# Patient Record
Sex: MALE | Race: WHITE | NOT HISPANIC OR LATINO | URBAN - METROPOLITAN AREA
[De-identification: names, ages, dates, MRNs, and addresses within clinical notes are randomized per-mention and may not be internally consistent; named-entity substitution may affect disease eponyms.]

---

## 2017-07-04 ENCOUNTER — INPATIENT (INPATIENT)
Facility: HOSPITAL | Age: 32
LOS: 1 days | Discharge: HOME | DRG: 287 | End: 2017-07-06
Attending: STUDENT IN AN ORGANIZED HEALTH CARE EDUCATION/TRAINING PROGRAM | Admitting: STUDENT IN AN ORGANIZED HEALTH CARE EDUCATION/TRAINING PROGRAM
Payer: COMMERCIAL

## 2017-07-04 VITALS
WEIGHT: 230.6 LBS | DIASTOLIC BLOOD PRESSURE: 77 MMHG | RESPIRATION RATE: 10 BRPM | HEART RATE: 68 BPM | SYSTOLIC BLOOD PRESSURE: 129 MMHG | OXYGEN SATURATION: 97 % | TEMPERATURE: 100 F | HEIGHT: 71 IN

## 2017-07-04 DIAGNOSIS — R07.9 CHEST PAIN, UNSPECIFIED: ICD-10-CM

## 2017-07-04 DIAGNOSIS — I40.9 ACUTE MYOCARDITIS, UNSPECIFIED: ICD-10-CM

## 2017-07-04 LAB
ALBUMIN SERPL ELPH-MCNC: 3.5 G/DL — SIGNIFICANT CHANGE UP (ref 3.3–5)
ALP SERPL-CCNC: 66 U/L — SIGNIFICANT CHANGE UP (ref 40–120)
ALT FLD-CCNC: 28 U/L RC — SIGNIFICANT CHANGE UP (ref 10–45)
ANION GAP SERPL CALC-SCNC: 16 MMOL/L — SIGNIFICANT CHANGE UP (ref 5–17)
APTT BLD: 59.8 SEC — HIGH (ref 27.5–37.4)
AST SERPL-CCNC: 53 U/L — HIGH (ref 10–40)
BASOPHILS # BLD AUTO: 0.1 K/UL — SIGNIFICANT CHANGE UP (ref 0–0.2)
BASOPHILS NFR BLD AUTO: 0.6 % — SIGNIFICANT CHANGE UP (ref 0–2)
BILIRUB SERPL-MCNC: 0.3 MG/DL — SIGNIFICANT CHANGE UP (ref 0.2–1.2)
BUN SERPL-MCNC: 9 MG/DL — SIGNIFICANT CHANGE UP (ref 7–23)
CALCIUM SERPL-MCNC: 8.6 MG/DL — SIGNIFICANT CHANGE UP (ref 8.4–10.5)
CHLORIDE SERPL-SCNC: 106 MMOL/L — SIGNIFICANT CHANGE UP (ref 96–108)
CHOLEST SERPL-MCNC: 102 MG/DL — SIGNIFICANT CHANGE UP (ref 10–199)
CK MB BLD-MCNC: 6.4 % — HIGH (ref 0–3.5)
CK MB CFR SERPL CALC: 28.2 NG/ML — HIGH (ref 0–6.7)
CK SERPL-CCNC: 439 U/L — HIGH (ref 30–200)
CO2 SERPL-SCNC: 20 MMOL/L — LOW (ref 22–31)
CREAT SERPL-MCNC: 0.83 MG/DL — SIGNIFICANT CHANGE UP (ref 0.5–1.3)
CRP SERPL-MCNC: 4.2 MG/DL — HIGH (ref 0–0.4)
EOSINOPHIL # BLD AUTO: 0.2 K/UL — SIGNIFICANT CHANGE UP (ref 0–0.5)
EOSINOPHIL NFR BLD AUTO: 1.8 % — SIGNIFICANT CHANGE UP (ref 0–6)
ERYTHROCYTE [SEDIMENTATION RATE] IN BLOOD: 9 MM/HR — SIGNIFICANT CHANGE UP (ref 0–15)
GLUCOSE SERPL-MCNC: 111 MG/DL — HIGH (ref 70–99)
HCT VFR BLD CALC: 41.1 % — SIGNIFICANT CHANGE UP (ref 39–50)
HDLC SERPL-MCNC: 31 MG/DL — LOW (ref 40–125)
HGB BLD-MCNC: 14.2 G/DL — SIGNIFICANT CHANGE UP (ref 13–17)
INR BLD: 1.16 RATIO — SIGNIFICANT CHANGE UP (ref 0.88–1.16)
LIPID PNL WITH DIRECT LDL SERPL: 60 MG/DL — SIGNIFICANT CHANGE UP
LYMPHOCYTES # BLD AUTO: 19.7 % — SIGNIFICANT CHANGE UP (ref 13–44)
LYMPHOCYTES # BLD AUTO: 2.4 K/UL — SIGNIFICANT CHANGE UP (ref 1–3.3)
MAGNESIUM SERPL-MCNC: 1.8 MG/DL — SIGNIFICANT CHANGE UP (ref 1.6–2.6)
MCHC RBC-ENTMCNC: 31.5 PG — SIGNIFICANT CHANGE UP (ref 27–34)
MCHC RBC-ENTMCNC: 34.5 GM/DL — SIGNIFICANT CHANGE UP (ref 32–36)
MCV RBC AUTO: 91.4 FL — SIGNIFICANT CHANGE UP (ref 80–100)
MONOCYTES # BLD AUTO: 1.6 K/UL — HIGH (ref 0–0.9)
MONOCYTES NFR BLD AUTO: 13 % — SIGNIFICANT CHANGE UP (ref 2–14)
NEUTROPHILS # BLD AUTO: 7.9 K/UL — HIGH (ref 1.8–7.4)
NEUTROPHILS NFR BLD AUTO: 64.9 % — SIGNIFICANT CHANGE UP (ref 43–77)
PHOSPHATE SERPL-MCNC: 2.7 MG/DL — SIGNIFICANT CHANGE UP (ref 2.5–4.5)
PLATELET # BLD AUTO: 225 K/UL — SIGNIFICANT CHANGE UP (ref 150–400)
POTASSIUM SERPL-MCNC: 3.5 MMOL/L — SIGNIFICANT CHANGE UP (ref 3.5–5.3)
POTASSIUM SERPL-SCNC: 3.5 MMOL/L — SIGNIFICANT CHANGE UP (ref 3.5–5.3)
PROT SERPL-MCNC: 6.4 G/DL — SIGNIFICANT CHANGE UP (ref 6–8.3)
PROTHROM AB SERPL-ACNC: 12.6 SEC — SIGNIFICANT CHANGE UP (ref 9.8–12.7)
RBC # BLD: 4.49 M/UL — SIGNIFICANT CHANGE UP (ref 4.2–5.8)
RBC # FLD: 12.8 % — SIGNIFICANT CHANGE UP (ref 10.3–14.5)
SODIUM SERPL-SCNC: 142 MMOL/L — SIGNIFICANT CHANGE UP (ref 135–145)
TOTAL CHOLESTEROL/HDL RATIO MEASUREMENT: 3.3 RATIO — LOW (ref 3.4–9.6)
TRIGL SERPL-MCNC: 53 MG/DL — SIGNIFICANT CHANGE UP (ref 10–149)
TROPONIN T SERPL-MCNC: 0.42 NG/ML — HIGH (ref 0–0.06)
TSH SERPL-MCNC: 1.24 UIU/ML — SIGNIFICANT CHANGE UP (ref 0.27–4.2)
WBC # BLD: 12.1 K/UL — HIGH (ref 3.8–10.5)
WBC # FLD AUTO: 12.1 K/UL — HIGH (ref 3.8–10.5)

## 2017-07-04 PROCEDURE — 93458 L HRT ARTERY/VENTRICLE ANGIO: CPT | Mod: 26,GC

## 2017-07-04 PROCEDURE — 93010 ELECTROCARDIOGRAM REPORT: CPT

## 2017-07-04 PROCEDURE — 71010: CPT | Mod: 26

## 2017-07-04 RX ORDER — COLCHICINE 0.6 MG
0.6 TABLET ORAL DAILY
Qty: 0 | Refills: 0 | Status: DISCONTINUED | OUTPATIENT
Start: 2017-07-04 | End: 2017-07-05

## 2017-07-04 RX ORDER — ACETAMINOPHEN 500 MG
650 TABLET ORAL EVERY 6 HOURS
Qty: 0 | Refills: 0 | Status: DISCONTINUED | OUTPATIENT
Start: 2017-07-04 | End: 2017-07-06

## 2017-07-04 RX ORDER — METOPROLOL TARTRATE 50 MG
12.5 TABLET ORAL
Qty: 0 | Refills: 0 | Status: DISCONTINUED | OUTPATIENT
Start: 2017-07-04 | End: 2017-07-06

## 2017-07-04 RX ORDER — POTASSIUM CHLORIDE 20 MEQ
20 PACKET (EA) ORAL
Qty: 0 | Refills: 0 | Status: COMPLETED | OUTPATIENT
Start: 2017-07-04 | End: 2017-07-04

## 2017-07-04 RX ORDER — MAGNESIUM SULFATE 500 MG/ML
1 VIAL (ML) INJECTION ONCE
Qty: 0 | Refills: 0 | Status: COMPLETED | OUTPATIENT
Start: 2017-07-04 | End: 2017-07-04

## 2017-07-04 RX ORDER — IBUPROFEN 200 MG
300 TABLET ORAL ONCE
Qty: 0 | Refills: 0 | Status: COMPLETED | OUTPATIENT
Start: 2017-07-04 | End: 2017-07-04

## 2017-07-04 RX ADMIN — Medication 20 MILLIEQUIVALENT(S): at 21:05

## 2017-07-04 RX ADMIN — Medication 20 MILLIEQUIVALENT(S): at 23:26

## 2017-07-04 RX ADMIN — Medication 20 MILLIEQUIVALENT(S): at 22:07

## 2017-07-04 RX ADMIN — Medication 300 MILLIGRAM(S): at 23:38

## 2017-07-04 RX ADMIN — Medication 0.6 MILLIGRAM(S): at 23:38

## 2017-07-04 RX ADMIN — Medication 100 GRAM(S): at 21:07

## 2017-07-04 NOTE — H&P ADULT - PROBLEM SELECTOR PLAN 1
-Likely viral, currently afebrile.   -EKG with dynamic changes. Cardiac cath negative  -TTE, trend enzymes.   -f/u CRP/ESR. Considering starting NSAID/Colchicine.

## 2017-07-04 NOTE — H&P ADULT - ASSESSMENT
31 M with no PMH who presented with substernal chest pain with dynamic EKG changes with non-significant CAD likely 2/2 to shashank-myocarditis

## 2017-07-04 NOTE — H&P ADULT - NSHPPHYSICALEXAM_GEN_ALL_CORE
GENERAL APPEARANCE: Well developed, well nourished, alert and cooperative. NAD.   HEENT:  clear conjunctiva, EOMI. External auditory canals normal, hearing grossly intact.  NECK: Neck supple, non-tender without lymphadenopathy, masses or thyromegaly.  CARDIAC: Normal S1 and S2. No S3, S4 or murmurs. Rhythm is regular.  LUNGS: Clear to auscultation and percussion without rales, rhonchi, wheezing or diminished breath sounds.  ABDOMEN: Positive bowel sounds. Soft, nondistended, nontender. No guarding or rebound.   MUSKULOSKELETAL: ROM intact spine and extremities. No joint erythema or tenderness.   BACK: normal gait and posture, no spinal deformity, symmetry of spinal muscles, without tenderness, decreased range of motion.  EXTREMITIES: No significant deformity or joint abnormality. No edema. Peripheral pulses intact. No varicosities.  NEUROLOGICAL: CN II-XII intact. Strength and sensation symmetric and intact throughout.   SKIN: Skin normal color, texture and turgor with no lesions or eruptions.  PSYCHIATRIC: AOx3.Normal affect and behavior.

## 2017-07-04 NOTE — H&P ADULT - ATTENDING COMMENTS
Young patient presents with chest pain, elevated cardiac biomarkers, and dynamic EKG changes concerning for ACS, cath showed clean coronaries and reduced EF consistent with acute myocarditis. Given recent gastroenteritis, suspect postviral myocarditis.  Echo confirms reduced LVEF.  Biomarkers trending down. Patient is afebrile.    Symptomatically improved.  Discontinue NSAID.  Uptitrate beta-blocker and ACEi as BP permits.

## 2017-07-04 NOTE — H&P ADULT - NSHPREVIEWOFSYSTEMS_GEN_ALL_CORE
CONSTITUTIONAL:  +fever  HEENT:  Eyes:  No visual loss, blurred vision, double vision or yellow sclerae. Ears, Nose, Throat:  +runny nose  SKIN:  No rash or itching.  CARDIOVASCULAR:  + chest pain, no palpitations  RESPIRATORY:  No shortness of breath, cough or sputum.  GASTROINTESTINAL:  +nausea, vomiting, and heartburn  GENITOURINARY:  Denies hematuria, dysuria.   NEUROLOGICAL:  No headache, dizziness, syncope, paralysis, ataxia, numbness or tingling in the extremities. No change in bowel or bladder control.  MUSCULOSKELETAL:  No muscle, back pain, joint pain or stiffness.  HEMATOLOGIC:  No anemia, bleeding or bruising.  LYMPHATICS:  No enlarged nodes. No history of splenectomy.  PSYCHIATRIC:  No history of depression or anxiety.  ENDOCRINOLOGIC:  No reports of sweating, cold or heat intolerance. No polyuria or polydipsia.  ALLERGIES:  No history of asthma, hives, eczema or rhinitis.

## 2017-07-05 LAB
ALBUMIN SERPL ELPH-MCNC: 3.4 G/DL — SIGNIFICANT CHANGE UP (ref 3.3–5)
ALBUMIN SERPL ELPH-MCNC: 3.5 G/DL — SIGNIFICANT CHANGE UP (ref 3.3–5)
ALP SERPL-CCNC: 64 U/L — SIGNIFICANT CHANGE UP (ref 40–120)
ALP SERPL-CCNC: 66 U/L — SIGNIFICANT CHANGE UP (ref 40–120)
ALT FLD-CCNC: 29 U/L RC — SIGNIFICANT CHANGE UP (ref 10–45)
ALT FLD-CCNC: 30 U/L RC — SIGNIFICANT CHANGE UP (ref 10–45)
ANION GAP SERPL CALC-SCNC: 13 MMOL/L — SIGNIFICANT CHANGE UP (ref 5–17)
ANION GAP SERPL CALC-SCNC: 15 MMOL/L — SIGNIFICANT CHANGE UP (ref 5–17)
APTT BLD: 26.4 SEC — LOW (ref 27.5–37.4)
AST SERPL-CCNC: 46 U/L — HIGH (ref 10–40)
AST SERPL-CCNC: 58 U/L — HIGH (ref 10–40)
BASOPHILS # BLD AUTO: 0.1 K/UL — SIGNIFICANT CHANGE UP (ref 0–0.2)
BASOPHILS # BLD AUTO: 0.1 K/UL — SIGNIFICANT CHANGE UP (ref 0–0.2)
BASOPHILS NFR BLD AUTO: 0.4 % — SIGNIFICANT CHANGE UP (ref 0–2)
BASOPHILS NFR BLD AUTO: 0.5 % — SIGNIFICANT CHANGE UP (ref 0–2)
BILIRUB SERPL-MCNC: 0.2 MG/DL — SIGNIFICANT CHANGE UP (ref 0.2–1.2)
BILIRUB SERPL-MCNC: 0.2 MG/DL — SIGNIFICANT CHANGE UP (ref 0.2–1.2)
BLD GP AB SCN SERPL QL: NEGATIVE — SIGNIFICANT CHANGE UP
BUN SERPL-MCNC: 9 MG/DL — SIGNIFICANT CHANGE UP (ref 7–23)
BUN SERPL-MCNC: 9 MG/DL — SIGNIFICANT CHANGE UP (ref 7–23)
CALCIUM SERPL-MCNC: 8.7 MG/DL — SIGNIFICANT CHANGE UP (ref 8.4–10.5)
CALCIUM SERPL-MCNC: 8.8 MG/DL — SIGNIFICANT CHANGE UP (ref 8.4–10.5)
CHLORIDE SERPL-SCNC: 104 MMOL/L — SIGNIFICANT CHANGE UP (ref 96–108)
CHLORIDE SERPL-SCNC: 105 MMOL/L — SIGNIFICANT CHANGE UP (ref 96–108)
CK MB BLD-MCNC: 7.5 % — HIGH (ref 0–3.5)
CK MB BLD-MCNC: 7.5 % — HIGH (ref 0–3.5)
CK MB CFR SERPL CALC: 28.2 NG/ML — HIGH (ref 0–6.7)
CK MB CFR SERPL CALC: 35.6 NG/ML — HIGH (ref 0–6.7)
CK SERPL-CCNC: 378 U/L — HIGH (ref 30–200)
CK SERPL-CCNC: 474 U/L — HIGH (ref 30–200)
CO2 SERPL-SCNC: 22 MMOL/L — SIGNIFICANT CHANGE UP (ref 22–31)
CO2 SERPL-SCNC: 23 MMOL/L — SIGNIFICANT CHANGE UP (ref 22–31)
CREAT SERPL-MCNC: 0.81 MG/DL — SIGNIFICANT CHANGE UP (ref 0.5–1.3)
CREAT SERPL-MCNC: 0.91 MG/DL — SIGNIFICANT CHANGE UP (ref 0.5–1.3)
EOSINOPHIL # BLD AUTO: 0.2 K/UL — SIGNIFICANT CHANGE UP (ref 0–0.5)
EOSINOPHIL # BLD AUTO: 0.2 K/UL — SIGNIFICANT CHANGE UP (ref 0–0.5)
EOSINOPHIL NFR BLD AUTO: 1.4 % — SIGNIFICANT CHANGE UP (ref 0–6)
EOSINOPHIL NFR BLD AUTO: 1.6 % — SIGNIFICANT CHANGE UP (ref 0–6)
GLUCOSE SERPL-MCNC: 109 MG/DL — HIGH (ref 70–99)
GLUCOSE SERPL-MCNC: 111 MG/DL — HIGH (ref 70–99)
HBA1C BLD-MCNC: 5.8 % — HIGH (ref 4–5.6)
HCT VFR BLD CALC: 39.8 % — SIGNIFICANT CHANGE UP (ref 39–50)
HCT VFR BLD CALC: 42.9 % — SIGNIFICANT CHANGE UP (ref 39–50)
HGB BLD-MCNC: 13.8 G/DL — SIGNIFICANT CHANGE UP (ref 13–17)
HGB BLD-MCNC: 14.3 G/DL — SIGNIFICANT CHANGE UP (ref 13–17)
INR BLD: 1.05 RATIO — SIGNIFICANT CHANGE UP (ref 0.88–1.16)
LYMPHOCYTES # BLD AUTO: 1.9 K/UL — SIGNIFICANT CHANGE UP (ref 1–3.3)
LYMPHOCYTES # BLD AUTO: 13.7 % — SIGNIFICANT CHANGE UP (ref 13–44)
LYMPHOCYTES # BLD AUTO: 14.6 % — SIGNIFICANT CHANGE UP (ref 13–44)
LYMPHOCYTES # BLD AUTO: 2.1 K/UL — SIGNIFICANT CHANGE UP (ref 1–3.3)
MAGNESIUM SERPL-MCNC: 2 MG/DL — SIGNIFICANT CHANGE UP (ref 1.6–2.6)
MAGNESIUM SERPL-MCNC: 2.1 MG/DL — SIGNIFICANT CHANGE UP (ref 1.6–2.6)
MCHC RBC-ENTMCNC: 30.6 PG — SIGNIFICANT CHANGE UP (ref 27–34)
MCHC RBC-ENTMCNC: 32.2 PG — SIGNIFICANT CHANGE UP (ref 27–34)
MCHC RBC-ENTMCNC: 33.2 GM/DL — SIGNIFICANT CHANGE UP (ref 32–36)
MCHC RBC-ENTMCNC: 34.8 GM/DL — SIGNIFICANT CHANGE UP (ref 32–36)
MCV RBC AUTO: 92.1 FL — SIGNIFICANT CHANGE UP (ref 80–100)
MCV RBC AUTO: 92.5 FL — SIGNIFICANT CHANGE UP (ref 80–100)
MONOCYTES # BLD AUTO: 1.4 K/UL — HIGH (ref 0–0.9)
MONOCYTES # BLD AUTO: 1.6 K/UL — HIGH (ref 0–0.9)
MONOCYTES NFR BLD AUTO: 10.4 % — SIGNIFICANT CHANGE UP (ref 2–14)
MONOCYTES NFR BLD AUTO: 11.4 % — SIGNIFICANT CHANGE UP (ref 2–14)
NEUTROPHILS # BLD AUTO: 10.1 K/UL — HIGH (ref 1.8–7.4)
NEUTROPHILS # BLD AUTO: 10.2 K/UL — HIGH (ref 1.8–7.4)
NEUTROPHILS NFR BLD AUTO: 72 % — SIGNIFICANT CHANGE UP (ref 43–77)
NEUTROPHILS NFR BLD AUTO: 74 % — SIGNIFICANT CHANGE UP (ref 43–77)
PHOSPHATE SERPL-MCNC: 2.5 MG/DL — SIGNIFICANT CHANGE UP (ref 2.5–4.5)
PHOSPHATE SERPL-MCNC: 3.2 MG/DL — SIGNIFICANT CHANGE UP (ref 2.5–4.5)
PLATELET # BLD AUTO: 210 K/UL — SIGNIFICANT CHANGE UP (ref 150–400)
PLATELET # BLD AUTO: 219 K/UL — SIGNIFICANT CHANGE UP (ref 150–400)
POTASSIUM SERPL-MCNC: 3.9 MMOL/L — SIGNIFICANT CHANGE UP (ref 3.5–5.3)
POTASSIUM SERPL-MCNC: 4 MMOL/L — SIGNIFICANT CHANGE UP (ref 3.5–5.3)
POTASSIUM SERPL-SCNC: 3.9 MMOL/L — SIGNIFICANT CHANGE UP (ref 3.5–5.3)
POTASSIUM SERPL-SCNC: 4 MMOL/L — SIGNIFICANT CHANGE UP (ref 3.5–5.3)
PROT SERPL-MCNC: 6.4 G/DL — SIGNIFICANT CHANGE UP (ref 6–8.3)
PROT SERPL-MCNC: 6.5 G/DL — SIGNIFICANT CHANGE UP (ref 6–8.3)
PROTHROM AB SERPL-ACNC: 11.4 SEC — SIGNIFICANT CHANGE UP (ref 9.8–12.7)
RBC # BLD: 4.3 M/UL — SIGNIFICANT CHANGE UP (ref 4.2–5.8)
RBC # BLD: 4.66 M/UL — SIGNIFICANT CHANGE UP (ref 4.2–5.8)
RBC # FLD: 12.8 % — SIGNIFICANT CHANGE UP (ref 10.3–14.5)
RBC # FLD: 12.9 % — SIGNIFICANT CHANGE UP (ref 10.3–14.5)
RH IG SCN BLD-IMP: POSITIVE — SIGNIFICANT CHANGE UP
SODIUM SERPL-SCNC: 141 MMOL/L — SIGNIFICANT CHANGE UP (ref 135–145)
SODIUM SERPL-SCNC: 141 MMOL/L — SIGNIFICANT CHANGE UP (ref 135–145)
TROPONIN T SERPL-MCNC: 0.49 NG/ML — HIGH (ref 0–0.06)
TROPONIN T SERPL-MCNC: 0.55 NG/ML — HIGH (ref 0–0.06)
WBC # BLD: 13.7 K/UL — HIGH (ref 3.8–10.5)
WBC # BLD: 14.2 K/UL — HIGH (ref 3.8–10.5)
WBC # FLD AUTO: 13.7 K/UL — HIGH (ref 3.8–10.5)
WBC # FLD AUTO: 14.2 K/UL — HIGH (ref 3.8–10.5)

## 2017-07-05 PROCEDURE — 99291 CRITICAL CARE FIRST HOUR: CPT

## 2017-07-05 PROCEDURE — 93306 TTE W/DOPPLER COMPLETE: CPT | Mod: 26,77

## 2017-07-05 RX ORDER — POTASSIUM CHLORIDE 20 MEQ
20 PACKET (EA) ORAL ONCE
Qty: 0 | Refills: 0 | Status: DISCONTINUED | OUTPATIENT
Start: 2017-07-05 | End: 2017-07-05

## 2017-07-05 RX ORDER — LISINOPRIL 2.5 MG/1
2.5 TABLET ORAL DAILY
Qty: 0 | Refills: 0 | Status: DISCONTINUED | OUTPATIENT
Start: 2017-07-05 | End: 2017-07-06

## 2017-07-05 RX ORDER — HEPARIN SODIUM 5000 [USP'U]/ML
5000 INJECTION INTRAVENOUS; SUBCUTANEOUS EVERY 12 HOURS
Qty: 0 | Refills: 0 | Status: DISCONTINUED | OUTPATIENT
Start: 2017-07-05 | End: 2017-07-06

## 2017-07-05 RX ADMIN — Medication 12.5 MILLIGRAM(S): at 18:19

## 2017-07-05 RX ADMIN — Medication 650 MILLIGRAM(S): at 18:20

## 2017-07-05 RX ADMIN — Medication 650 MILLIGRAM(S): at 19:00

## 2017-07-05 RX ADMIN — Medication 12.5 MILLIGRAM(S): at 06:02

## 2017-07-05 RX ADMIN — LISINOPRIL 2.5 MILLIGRAM(S): 2.5 TABLET ORAL at 11:37

## 2017-07-05 RX ADMIN — HEPARIN SODIUM 5000 UNIT(S): 5000 INJECTION INTRAVENOUS; SUBCUTANEOUS at 22:06

## 2017-07-05 RX ADMIN — HEPARIN SODIUM 5000 UNIT(S): 5000 INJECTION INTRAVENOUS; SUBCUTANEOUS at 11:37

## 2017-07-05 RX ADMIN — Medication 300 MILLIGRAM(S): at 00:40

## 2017-07-05 NOTE — CHART NOTE - NSCHARTNOTEFT_GEN_A_CORE
====================  NEW EVENTS:  ====================  Continues to have chest discomfort. Started on colchicine. Was given 300 mg ibuprofen for muscle aches. No occlusive coronary disease on cath. EF estimated was 35 %.    ====================  SUMMARY:  ====================  31 M with no PMH who presented with substernal chest pain with dynamic EKG changes with non-significant CAD likely 2/2 to shashank-myocarditis    ====================  VITALS:  ====================    ICU Vital Signs Last 24 Hrs  T(C): 37.2 (04 Jul 2017 23:00), Max: 37.5 (04 Jul 2017 19:09)  T(F): 98.9 (04 Jul 2017 23:00), Max: 99.5 (04 Jul 2017 19:09)  HR: 62 (04 Jul 2017 23:00) (56 - 68)  BP: 121/66 (04 Jul 2017 23:00) (119/66 - 131/76)  BP(mean): 84 (04 Jul 2017 23:00) (84 - 95)  ABP: --  ABP(mean): --  RR: 22 (04 Jul 2017 23:00) (10 - 22)  SpO2: 99% (04 Jul 2017 23:00) (97% - 100%)      I&O's Summary    04 Jul 2017 07:01  -  05 Jul 2017 00:32  --------------------------------------------------------  IN: 240 mL / OUT: 0 mL / NET: 240 mL        Adult Advanced Hemodynamics Last 24 Hrs  CVP(mm Hg): --  CVP(cm H2O): --  CO: --  CI: --  PA: --  PA(mean): --  PCWP: --  SVR: --  SVRI: --  PVR: --  PVRI: --        ====================  LABS:  ====================                          14.2   12.1  )-----------( 225      ( 04 Jul 2017 20:05 )             41.1     07-04    142  |  106  |  9   ----------------------------<  111<H>  3.5   |  20<L>  |  0.83    Ca    8.6      04 Jul 2017 20:05  Phos  2.7     07-04  Mg     1.8     07-04    TPro  6.4  /  Alb  3.5  /  TBili  0.3  /  DBili  x   /  AST  53<H>  /  ALT  28  /  AlkPhos  66  07-04    PT/INR - ( 04 Jul 2017 20:05 )   PT: 12.6 sec;   INR: 1.16 ratio         PTT - ( 04 Jul 2017 20:05 )  PTT:59.8 sec  Troponin T, Serum: 0.42 ng/mL <H> (07-04-17 @ 20:05)  Creatine Kinase, Serum: 439 U/L <H> (07-04-17 @ 20:05)        ====================  PLAN:  ====================  - Pending repeat TTE  - Continue colchicine

## 2017-07-05 NOTE — PROGRESS NOTE ADULT - PROBLEM SELECTOR PLAN 1
- cath on 7/4 normal vessels, EF 35%  - CE trending down  - given 300 ibuprofen x1  - started on colchicine .6mg daily

## 2017-07-05 NOTE — CHART NOTE - NSCHARTNOTEFT_GEN_A_CORE
====================  CCU MIDNIGHT ROUNDS  ====================    ELANA MENDOZA  [id number]    ====================  SUMMARY:  ====================    31 M with no PMH who presented with substernal chest pain with dynamic EKG changes with non-significant CAD likely 2/2 to leandro-pericarditis    ====================  NEW EVENTS:  ====================    no CP/palpitations/SOB. No fevers/chills     ====================  VITALS (Last 12 hrs):  ====================    T(C): 37.2 (07-05-17 @ 19:00), Max: 37.2 (07-05-17 @ 19:00)  HR: 56 (07-05-17 @ 22:00) (52 - 76)  BP: 127/66 (07-05-17 @ 22:00) (119/77 - 148/79)  BP(mean): 86 (07-05-17 @ 22:00) (86 - 125)  RR: 15 (07-05-17 @ 22:00) (10 - 32)  SpO2: 98% (07-05-17 @ 19:00) (98% - 98%)    TELEMETRY: sinus arleen    I&O's Summary    04 Jul 2017 07:01 - 05 Jul 2017 07:00  --------------------------------------------------------  IN: 360 mL / OUT: 600 mL / NET: -240 mL    05 Jul 2017 07:01 - 05 Jul 2017 22:57  --------------------------------------------------------  IN: 720 mL / OUT: 0 mL / NET: 720 mL    ====================  PLAN:  ====================  1. Acute myopericarditis - likely post viral - TTE w/ low nml LV function - no sx @ this time, c/w ACE, BB, up-titrate as tolerated     Jinny Vale CCU NP   #[id number]

## 2017-07-05 NOTE — PROGRESS NOTE ADULT - SUBJECTIVE AND OBJECTIVE BOX
PATIENT:  ELANA MENDOZA  [id number]    CHIEF COMPLAINT:  Patient is a 31y old  Male who presents with a chief complaint of Chest pain (2017 19:28)      INTERVAL HISTORY: Patient seen and examined at bedside. No acute events overnight.     REVIEW OF SYSTEMS:    CONSTITUTIONAL: No weakness, fevers or chills  EYES/ENT: No visual changes;  No vertigo or throat pain   NECK: No pain or stiffness  RESPIRATORY: No cough, wheezing, hemoptysis; No shortness of breath  CARDIOVASCULAR: No chest pain or palpitations  GASTROINTESTINAL: No abdominal or epigastric pain. No nausea, vomiting, or hematemesis; No diarrhea or constipation. No melena or hematochezia.  GENITOURINARY: No dysuria, frequency or hematuria  NEUROLOGICAL: No numbness or weakness  SKIN: No itching, burning, rashes, or lesions   All other review of systems is negative unless indicated above.    MEDICATIONS  (STANDING):  metoprolol 12.5 milliGRAM(s) Oral two times a day  colchicine 0.6 milliGRAM(s) Oral daily  heparin  Injectable 5000 Unit(s) SubCutaneous every 12 hours    MEDICATIONS  (PRN):  acetaminophen   Tablet. 650 milliGRAM(s) Oral every 6 hours PRN mild and moderate pain      OBJECTIVE:  ICU Vital Signs Last 24 Hrs  T(C): 36.8 (2017 05:00), Max: 37.5 (2017 19:09)  T(F): 98.3 (2017 05:00), Max: 99.5 (2017 19:09)  HR: 62 (2017 07:00) (54 - 68)  BP: 135/73 (2017 07:00) (108/64 - 135/73)  BP(mean): 99 (2017 07:00) (79 - 99)  ABP: --  ABP(mean): --  RR: 19 (2017 07:00) (10 - 22)  SpO2: 97% (2017 05:00) (96% - 100%)      Adult Advanced Hemodynamics Last 24 Hrs  CVP(mm Hg): --  CVP(cm H2O): --  CO: --  CI: --  PA: --  PA(mean): --  PCWP: --  SVR: --  SVRI: --  PVR: --  PVRI: --  CAPILLARY BLOOD GLUCOSE        CAPILLARY BLOOD GLUCOSE          I&O's Summary    2017 07:01  -  2017 07:00  --------------------------------------------------------  IN: 360 mL / OUT: 600 mL / NET: -240 mL      Daily Height in cm: 180.34 (2017 19:09)    Daily Weight in k.5 (2017 06:00)    General: WN/WD NAD  HEENT: PERRLA, EOMI, moist mucous membranes  Neurology: A&Ox3, nonfocal, MERRITT x 4  Respiratory: CTA B/L, normal respiratory effort, no wheezes, crackles, rales  CV: RRR, S1S2, no murmurs, rubs or gallops  Abdominal: Soft, NT, ND +BS, Last BM  Extremities: No edema, + peripheral pulses  Incisions:   Tubes:    TELEMETRY:     EKG:     IMAGING:    LABS:                          13.8   13.7  )-----------( 219      ( 2017 06:37 )             39.8     07-05    141  |  104  |  9   ----------------------------<  111<H>  4.0   |  22  |  0.81    Ca    8.8      2017 06:37  Phos  2.5     07-  Mg     2.0     07-    TPro  6.4  /  Alb  3.4  /  TBili  0.2  /  DBili  x   /  AST  46<H>  /  ALT  29  /  AlkPhos  64  07-05    LIVER FUNCTIONS - ( 2017 06:37 )  Alb: 3.4 g/dL / Pro: 6.4 g/dL / ALK PHOS: 64 U/L / ALT: 29 U/L RC / AST: 46 U/L / GGT: x           PT/INR - ( 2017 01:41 )   PT: 11.4 sec;   INR: 1.05 ratio         PTT - ( 2017 01:41 )  PTT:26.4 sec  Troponin T, Serum: 0.49 ng/mL ( @ 06:37)  Creatine Kinase, Serum: 378 U/L ( @ 06:37)  CKMB Units: 28.2 ng/mL ( 06:37)  Troponin T, Serum: 0.55 ng/mL (:41)  Creatine Kinase, Serum: 474 U/L (:41)  CKMB Units: 35.6 ng/mL (:41)  CKMB Units: 28.2 ng/mL ( @ 20:05)  Troponin T, Serum: 0.42 ng/mL ( @ 20:05)  Creatine Kinase, Serum: 439 U/L ( @ 20:05) PATIENT:  ELANA MENDOZA  [id number]    CHIEF COMPLAINT:  Patient is a 31y old  Male who presents with a chief complaint of Chest pain (2017 19:28)      INTERVAL HISTORY: Patient seen and examined at bedside. No acute events overnight.     REVIEW OF SYSTEMS:    CONSTITUTIONAL: No weakness, fevers or chills  EYES/ENT: No visual changes;  No vertigo or throat pain   NECK: No pain or stiffness  RESPIRATORY: No cough, wheezing, hemoptysis; No shortness of breath  CARDIOVASCULAR: No chest pain or palpitations  GASTROINTESTINAL: No abdominal or epigastric pain. No nausea, vomiting, or hematemesis; No diarrhea or constipation. No melena or hematochezia.  GENITOURINARY: No dysuria, frequency or hematuria  NEUROLOGICAL: No numbness or weakness  SKIN: No itching, burning, rashes, or lesions   All other review of systems is negative unless indicated above.    MEDICATIONS  (STANDING):  metoprolol 12.5 milliGRAM(s) Oral two times a day  colchicine 0.6 milliGRAM(s) Oral daily  heparin  Injectable 5000 Unit(s) SubCutaneous every 12 hours    MEDICATIONS  (PRN):  acetaminophen   Tablet. 650 milliGRAM(s) Oral every 6 hours PRN mild and moderate pain      OBJECTIVE:  ICU Vital Signs Last 24 Hrs  T(C): 36.8 (2017 05:00), Max: 37.5 (2017 19:09)  T(F): 98.3 (2017 05:00), Max: 99.5 (2017 19:09)  HR: 62 (2017 07:00) (54 - 68)  BP: 135/73 (2017 07:00) (108/64 - 135/73)  BP(mean): 99 (2017 07:00) (79 - 99)  ABP: --  ABP(mean): --  RR: 19 (2017 07:00) (10 - 22)  SpO2: 97% (2017 05:00) (96% - 100%)      I&O's Summary    2017 07:01  -  2017 07:00  --------------------------------------------------------  IN: 360 mL / OUT: 600 mL / NET: -240 mL      Daily Height in cm: 180.34 (2017 19:09)    Daily Weight in k.5 (2017 06:00)    General: WN/WD NAD  HEENT: PERRLA, EOMI, moist mucous membranes  Neurology: A&Ox3, nonfocal, MERRITT x 4  Respiratory: CTA B/L, normal respiratory effort, no wheezes, crackles, rales  CV: RRR, S1S2, no murmurs, rubs or gallops  Abdominal: Soft, NT, ND +BS, Last BM  Extremities: No edema, + peripheral pulses  Incisions:   Tubes:    TELEMETRY:     EKG:     IMAGING:    LABS:                          13.8   13.7  )-----------( 219      ( 2017 06:37 )             39.8     -    141  |  104  |  9   ----------------------------<  111<H>  4.0   |  22  |  0.81    Ca    8.8      2017 06:37  Phos  2.5       Mg     2.0         TPro  6.4  /  Alb  3.4  /  TBili  0.2  /  DBili  x   /  AST  46<H>  /  ALT  29  /  AlkPhos  64  -    LIVER FUNCTIONS - ( 2017 06:37 )  Alb: 3.4 g/dL / Pro: 6.4 g/dL / ALK PHOS: 64 U/L / ALT: 29 U/L RC / AST: 46 U/L / GGT: x           PT/INR - ( 2017 01:41 )   PT: 11.4 sec;   INR: 1.05 ratio         PTT - ( 2017 01:41 )  PTT:26.4 sec  Troponin T, Serum: 0.49 ng/mL ( @ 06:37)  Creatine Kinase, Serum: 378 U/L ( @ 06:37)  CKMB Units: 28.2 ng/mL ( @ 06:37)  Troponin T, Serum: 0.55 ng/mL ( @ 01:41)  Creatine Kinase, Serum: 474 U/L ( 01:41)  CKMB Units: 35.6 ng/mL ( @ 01:41)  CKMB Units: 28.2 ng/mL ( @ 20:05)  Troponin T, Serum: 0.42 ng/mL ( @ 20:05)  Creatine Kinase, Serum: 439 U/L ( @ 20:05) PATIENT:  ELANA MENDOZA  [id number]    CHIEF COMPLAINT:  Patient is a 31y old  Male who presents with a chief complaint of Chest pain (2017 19:28)      INTERVAL HISTORY: Patient seen and examined at bedside. No acute events overnight. Denies any chest pain this morning.     REVIEW OF SYSTEMS:    CONSTITUTIONAL: No weakness, fevers or chills  EYES/ENT: No visual changes;  No vertigo or throat pain   NECK: No pain or stiffness  RESPIRATORY: No cough, wheezing, hemoptysis; No shortness of breath  CARDIOVASCULAR: No chest pain or palpitations  GASTROINTESTINAL: No abdominal or epigastric pain. No nausea, vomiting, or hematemesis; No diarrhea or constipation. No melena or hematochezia.  GENITOURINARY: No dysuria, frequency or hematuria  NEUROLOGICAL: No numbness or weakness  SKIN: No itching, burning, rashes, or lesions   All other review of systems is negative unless indicated above.    MEDICATIONS  (STANDING):  metoprolol 12.5 milliGRAM(s) Oral two times a day  colchicine 0.6 milliGRAM(s) Oral daily  heparin  Injectable 5000 Unit(s) SubCutaneous every 12 hours    MEDICATIONS  (PRN):  acetaminophen   Tablet. 650 milliGRAM(s) Oral every 6 hours PRN mild and moderate pain      OBJECTIVE:  ICU Vital Signs Last 24 Hrs  T(C): 36.8 (2017 05:00), Max: 37.5 (2017 19:09)  T(F): 98.3 (2017 05:00), Max: 99.5 (2017 19:09)  HR: 62 (2017 07:00) (54 - 68)  BP: 135/73 (2017 07:00) (108/64 - 135/73)  BP(mean): 99 (2017 07:00) (79 - 99)  ABP: --  ABP(mean): --  RR: 19 (2017 07:00) (10 - 22)  SpO2: 97% (2017 05:00) (96% - 100%)      I&O's Summary    2017 07:01  -  2017 07:00  --------------------------------------------------------  IN: 360 mL / OUT: 600 mL / NET: -240 mL      Daily Height in cm: 180.34 (2017 19:09)    Daily Weight in k.5 (2017 06:00)    General: WN/WD NAD  HEENT: PERRLA, EOMI, moist mucous membranes  Neurology: A&Ox3, nonfocal  Respiratory: CTA B/L, normal respiratory effort, no wheezes, crackles, rales  CV: RRR, S1S2, no murmurs, rubs or gallops  Abdominal: Soft, NT, ND +BS  Extremities: No edema, + peripheral pulses      LABS:                          13.8   13.7  )-----------( 219      ( 2017 06:37 )             39.8         141  |  104  |  9   ----------------------------<  111<H>  4.0   |  22  |  0.81    Ca    8.8      2017 06:37  Phos  2.5       Mg     2.0         TPro  6.4  /  Alb  3.4  /  TBili  0.2  /  DBili  x   /  AST  46<H>  /  ALT  29  /  AlkPhos  64  07-    LIVER FUNCTIONS - ( 2017 06:37 )  Alb: 3.4 g/dL / Pro: 6.4 g/dL / ALK PHOS: 64 U/L / ALT: 29 U/L RC / AST: 46 U/L / GGT: x           PT/INR - ( 2017 01:41 )   PT: 11.4 sec;   INR: 1.05 ratio         PTT - ( 2017 01:41 )  PTT:26.4 sec  Troponin T, Serum: 0.49 ng/mL ( @ 06:37)  Creatine Kinase, Serum: 378 U/L ( @ 06:37)  CKMB Units: 28.2 ng/mL ( 06:37)  Troponin T, Serum: 0.55 ng/mL ( 01:41)  Creatine Kinase, Serum: 474 U/L ( 01:41)  CKMB Units: 35.6 ng/mL ( 01:41)  CKMB Units: 28.2 ng/mL ( @ 20:05)  Troponin T, Serum: 0.42 ng/mL ( @ 20:05)  Creatine Kinase, Serum: 439 U/L ( @ 20:05)

## 2017-07-06 VITALS — HEART RATE: 60 BPM | SYSTOLIC BLOOD PRESSURE: 143 MMHG | DIASTOLIC BLOOD PRESSURE: 59 MMHG

## 2017-07-06 LAB
ALBUMIN SERPL ELPH-MCNC: 3.5 G/DL — SIGNIFICANT CHANGE UP (ref 3.3–5)
ALP SERPL-CCNC: 66 U/L — SIGNIFICANT CHANGE UP (ref 40–120)
ALT FLD-CCNC: 27 U/L RC — SIGNIFICANT CHANGE UP (ref 10–45)
ANION GAP SERPL CALC-SCNC: 11 MMOL/L — SIGNIFICANT CHANGE UP (ref 5–17)
AST SERPL-CCNC: 25 U/L — SIGNIFICANT CHANGE UP (ref 10–40)
BASOPHILS # BLD AUTO: 0.1 K/UL — SIGNIFICANT CHANGE UP (ref 0–0.2)
BASOPHILS NFR BLD AUTO: 0.7 % — SIGNIFICANT CHANGE UP (ref 0–2)
BILIRUB SERPL-MCNC: 0.2 MG/DL — SIGNIFICANT CHANGE UP (ref 0.2–1.2)
BUN SERPL-MCNC: 9 MG/DL — SIGNIFICANT CHANGE UP (ref 7–23)
CALCIUM SERPL-MCNC: 8.9 MG/DL — SIGNIFICANT CHANGE UP (ref 8.4–10.5)
CHLORIDE SERPL-SCNC: 105 MMOL/L — SIGNIFICANT CHANGE UP (ref 96–108)
CO2 SERPL-SCNC: 24 MMOL/L — SIGNIFICANT CHANGE UP (ref 22–31)
CREAT SERPL-MCNC: 0.8 MG/DL — SIGNIFICANT CHANGE UP (ref 0.5–1.3)
EOSINOPHIL # BLD AUTO: 0.2 K/UL — SIGNIFICANT CHANGE UP (ref 0–0.5)
EOSINOPHIL NFR BLD AUTO: 2.2 % — SIGNIFICANT CHANGE UP (ref 0–6)
GLUCOSE SERPL-MCNC: 114 MG/DL — HIGH (ref 70–99)
HCT VFR BLD CALC: 42.5 % — SIGNIFICANT CHANGE UP (ref 39–50)
HCV AB S/CO SERPL IA: 0.11 S/CO — SIGNIFICANT CHANGE UP
HCV AB SERPL-IMP: SIGNIFICANT CHANGE UP
HGB BLD-MCNC: 13.8 G/DL — SIGNIFICANT CHANGE UP (ref 13–17)
HIV 1+2 AB+HIV1 P24 AG SERPL QL IA: SIGNIFICANT CHANGE UP
LYMPHOCYTES # BLD AUTO: 2.2 K/UL — SIGNIFICANT CHANGE UP (ref 1–3.3)
LYMPHOCYTES # BLD AUTO: 22.3 % — SIGNIFICANT CHANGE UP (ref 13–44)
MAGNESIUM SERPL-MCNC: 2 MG/DL — SIGNIFICANT CHANGE UP (ref 1.6–2.6)
MCHC RBC-ENTMCNC: 29.8 PG — SIGNIFICANT CHANGE UP (ref 27–34)
MCHC RBC-ENTMCNC: 32.6 GM/DL — SIGNIFICANT CHANGE UP (ref 32–36)
MCV RBC AUTO: 91.7 FL — SIGNIFICANT CHANGE UP (ref 80–100)
MONOCYTES # BLD AUTO: 0.9 K/UL — SIGNIFICANT CHANGE UP (ref 0–0.9)
MONOCYTES NFR BLD AUTO: 9.3 % — SIGNIFICANT CHANGE UP (ref 2–14)
NEUTROPHILS # BLD AUTO: 6.4 K/UL — SIGNIFICANT CHANGE UP (ref 1.8–7.4)
NEUTROPHILS NFR BLD AUTO: 65.4 % — SIGNIFICANT CHANGE UP (ref 43–77)
PHOSPHATE SERPL-MCNC: 3.1 MG/DL — SIGNIFICANT CHANGE UP (ref 2.5–4.5)
PLATELET # BLD AUTO: 216 K/UL — SIGNIFICANT CHANGE UP (ref 150–400)
POTASSIUM SERPL-MCNC: 3.9 MMOL/L — SIGNIFICANT CHANGE UP (ref 3.5–5.3)
POTASSIUM SERPL-SCNC: 3.9 MMOL/L — SIGNIFICANT CHANGE UP (ref 3.5–5.3)
PROT SERPL-MCNC: 6.5 G/DL — SIGNIFICANT CHANGE UP (ref 6–8.3)
RBC # BLD: 4.63 M/UL — SIGNIFICANT CHANGE UP (ref 4.2–5.8)
RBC # FLD: 12.7 % — SIGNIFICANT CHANGE UP (ref 10.3–14.5)
SODIUM SERPL-SCNC: 140 MMOL/L — SIGNIFICANT CHANGE UP (ref 135–145)
WBC # BLD: 9.8 K/UL — SIGNIFICANT CHANGE UP (ref 3.8–10.5)
WBC # FLD AUTO: 9.8 K/UL — SIGNIFICANT CHANGE UP (ref 3.8–10.5)

## 2017-07-06 PROCEDURE — 85027 COMPLETE CBC AUTOMATED: CPT

## 2017-07-06 PROCEDURE — 85610 PROTHROMBIN TIME: CPT

## 2017-07-06 PROCEDURE — 93005 ELECTROCARDIOGRAM TRACING: CPT

## 2017-07-06 PROCEDURE — 87389 HIV-1 AG W/HIV-1&-2 AB AG IA: CPT

## 2017-07-06 PROCEDURE — 82550 ASSAY OF CK (CPK): CPT

## 2017-07-06 PROCEDURE — 93306 TTE W/DOPPLER COMPLETE: CPT

## 2017-07-06 PROCEDURE — 84443 ASSAY THYROID STIM HORMONE: CPT

## 2017-07-06 PROCEDURE — C1760: CPT

## 2017-07-06 PROCEDURE — 99233 SBSQ HOSP IP/OBS HIGH 50: CPT

## 2017-07-06 PROCEDURE — 85652 RBC SED RATE AUTOMATED: CPT

## 2017-07-06 PROCEDURE — 84100 ASSAY OF PHOSPHORUS: CPT

## 2017-07-06 PROCEDURE — 86900 BLOOD TYPING SEROLOGIC ABO: CPT

## 2017-07-06 PROCEDURE — 80061 LIPID PANEL: CPT

## 2017-07-06 PROCEDURE — 84484 ASSAY OF TROPONIN QUANT: CPT

## 2017-07-06 PROCEDURE — 86140 C-REACTIVE PROTEIN: CPT

## 2017-07-06 PROCEDURE — 86803 HEPATITIS C AB TEST: CPT

## 2017-07-06 PROCEDURE — 82553 CREATINE MB FRACTION: CPT

## 2017-07-06 PROCEDURE — C1887: CPT

## 2017-07-06 PROCEDURE — 85730 THROMBOPLASTIN TIME PARTIAL: CPT

## 2017-07-06 PROCEDURE — 93458 L HRT ARTERY/VENTRICLE ANGIO: CPT

## 2017-07-06 PROCEDURE — 80053 COMPREHEN METABOLIC PANEL: CPT

## 2017-07-06 PROCEDURE — 86850 RBC ANTIBODY SCREEN: CPT

## 2017-07-06 PROCEDURE — 83735 ASSAY OF MAGNESIUM: CPT

## 2017-07-06 PROCEDURE — C1894: CPT

## 2017-07-06 PROCEDURE — 83036 HEMOGLOBIN GLYCOSYLATED A1C: CPT

## 2017-07-06 PROCEDURE — 86901 BLOOD TYPING SEROLOGIC RH(D): CPT

## 2017-07-06 PROCEDURE — C1769: CPT

## 2017-07-06 PROCEDURE — 71045 X-RAY EXAM CHEST 1 VIEW: CPT

## 2017-07-06 RX ORDER — METOPROLOL TARTRATE 50 MG
0.5 TABLET ORAL
Qty: 15 | Refills: 0 | OUTPATIENT
Start: 2017-07-06 | End: 2017-08-05

## 2017-07-06 RX ORDER — LISINOPRIL 2.5 MG/1
1 TABLET ORAL
Qty: 30 | Refills: 0 | OUTPATIENT
Start: 2017-07-06 | End: 2017-08-05

## 2017-07-06 RX ADMIN — Medication 12.5 MILLIGRAM(S): at 06:13

## 2017-07-06 RX ADMIN — HEPARIN SODIUM 5000 UNIT(S): 5000 INJECTION INTRAVENOUS; SUBCUTANEOUS at 09:00

## 2017-07-06 RX ADMIN — LISINOPRIL 2.5 MILLIGRAM(S): 2.5 TABLET ORAL at 06:13

## 2017-07-06 NOTE — DISCHARGE NOTE ADULT - PLAN OF CARE
Continue taking medications as prescribed, follow up with cardiologist within 1 week of discharge. Continue taking medications as prescribed, follow up with cardiologist within 1 week of discharge. Avoid strenuous physical activity.

## 2017-07-06 NOTE — DISCHARGE NOTE ADULT - CARE PLAN
Principal Discharge DX:	Acute myocarditis, unspecified myocarditis type  Goal:	Continue taking medications as prescribed, follow up with cardiologist within 1 week of discharge.  Instructions for follow-up, activity and diet:	Continue taking medications as prescribed, follow up with cardiologist within 1 week of discharge. Avoid strenuous physical activity.

## 2017-07-06 NOTE — DISCHARGE NOTE ADULT - CARE PROVIDER_API CALL
Tj Menendez), Cardiology; Internal Medicine  [address] [place] [address]  Phone: [phone]  Fax: [phone]

## 2017-07-06 NOTE — DISCHARGE NOTE ADULT - MEDICATION SUMMARY - MEDICATIONS TO TAKE
I will START or STAY ON the medications listed below when I get home from the hospital:    lisinopril 2.5 mg oral tablet  -- 1 tab(s) by mouth once a day  -- Indication: For Cardiomyopathy    metoprolol succinate 25 mg oral tablet, extended release  -- 0.5 tab(s) by mouth once a day  -- It is very important that you take or use this exactly as directed.  Do not skip doses or discontinue unless directed by your doctor.  May cause drowsiness.  Alcohol may intensify this effect.  Use care when operating dangerous machinery.  Some non-prescription drugs may aggravate your condition.  Read all labels carefully.  If a warning appears, check with your doctor before taking.  Swallow whole.  Do not crush.  Take with food or milk.  This drug may impair the ability to drive or operate machinery.  Use care until you become familiar with its effects.    -- Indication: For Cardiomyopathy

## 2017-07-06 NOTE — PROGRESS NOTE ADULT - ATTENDING COMMENTS
Uptitrate beta-blocker and ACEi as BP permits.    Check vascular US to rule out pseudoaneurysm at tender groin site.

## 2017-07-06 NOTE — PROGRESS NOTE ADULT - PROBLEM SELECTOR PLAN 1
- no recurrent chest pain since admission  - s/p cath 7/4 with normal vessels, EF 35%  - f/u TTE showed low-normal LV function  - c/w metoprolol, lisinopril

## 2017-07-06 NOTE — DISCHARGE NOTE ADULT - HOSPITAL COURSE
31 year old male with no past medical history presented to [facility] with substernal chest pain with radiation down his left arm. Pain started the morning of 7/4 and was pressure-like. At [place], his cardiac enzymes were positive and he had dynamic EKG changes and was therefore transferred to [facility] for a cardiac catheterization which revealed no significant coronary artery disease and a reduced ejection fraction to 35%. Patient did not have any chest pain since 7/4. Due to a recent GI illness the week prior his chest pain was thought to be do to a viral myocarditis.    A transthoracic echo was done which showed low-normal left ventricular systolic function. He was started on metoprolol and lisinopril for cardiomyopathy. Patient hemodynamically stable for discharge with instructions to follow up with outpatient cardiologist and PCP.

## 2017-07-06 NOTE — PROGRESS NOTE ADULT - SUBJECTIVE AND OBJECTIVE BOX
PATIENT:  ELANA MENDOZA  [id number]    CHIEF COMPLAINT:  Patient is a 31y old  Male who presents with a chief complaint of Chest pain (04 Jul 2017 19:28)      INTERVAL HISTORY: Patient seen and examined at bedside. No events overnight. Denies any recurrent chest pain. No complaints this morning.    REVIEW OF SYSTEMS:    CONSTITUTIONAL: No weakness, fevers or chills  EYES/ENT: No visual changes;  No vertigo or throat pain   NECK: No pain or stiffness  RESPIRATORY: No cough, wheezing, hemoptysis; No shortness of breath  CARDIOVASCULAR: No chest pain or palpitations  GASTROINTESTINAL: No abdominal or epigastric pain. No nausea, vomiting, or hematemesis; No diarrhea or constipation. No melena or hematochezia.  GENITOURINARY: No dysuria, frequency or hematuria  NEUROLOGICAL: No numbness or weakness  SKIN: No itching, burning, rashes, or lesions   All other review of systems is negative unless indicated above.    MEDICATIONS  (STANDING):  metoprolol 12.5 milliGRAM(s) Oral two times a day  heparin  Injectable 5000 Unit(s) SubCutaneous every 12 hours  lisinopril 2.5 milliGRAM(s) Oral daily    MEDICATIONS  (PRN):  acetaminophen   Tablet. 650 milliGRAM(s) Oral every 6 hours PRN mild and moderate pain      OBJECTIVE:  ICU Vital Signs Last 24 Hrs  T(C): 36.6 (06 Jul 2017 04:00), Max: 37.2 (05 Jul 2017 19:00)  T(F): 97.8 (06 Jul 2017 04:00), Max: 98.9 (05 Jul 2017 19:00)  HR: 62 (06 Jul 2017 06:00) (50 - 76)  BP: 114/62 (06 Jul 2017 06:00) (101/66 - 148/79)  BP(mean): 80 (06 Jul 2017 06:00) (70 - 125)  ABP: --  ABP(mean): --  RR: 13 (06 Jul 2017 06:00) (10 - 32)  SpO2: 98% (05 Jul 2017 23:00) (98% - 98%)      I&O's Summary    05 Jul 2017 07:01  -  06 Jul 2017 07:00  --------------------------------------------------------  IN: 1200 mL / OUT: 0 mL / NET: 1200 mL      General: WN/WD NAD  HEENT: PERRLA, EOMI, moist mucous membranes  Neurology: A&Ox3, nonfocal, MERRITT x 4  Respiratory: CTA B/L, normal respiratory effort, no wheezes, crackles, rales  CV: RRR, S1S2, no murmurs, rubs or gallops  Abdominal: Soft, NT, ND +BS, Last BM  Extremities: No edema, + peripheral pulses  Incisions:   Tubes:    TELEMETRY:     EKG:     IMAGING:    LABS:                          13.8   9.8   )-----------( 216      ( 06 Jul 2017 06:19 )             42.5     07-06    140  |  105  |  9   ----------------------------<  114<H>  3.9   |  24  |  0.80    Ca    8.9      06 Jul 2017 06:19  Phos  3.1     07-06  Mg     2.0     07-06    TPro  6.5  /  Alb  3.5  /  TBili  0.2  /  DBili  x   /  AST  25  /  ALT  27  /  AlkPhos  66  07-06    LIVER FUNCTIONS - ( 06 Jul 2017 06:19 )  Alb: 3.5 g/dL / Pro: 6.5 g/dL / ALK PHOS: 66 U/L / ALT: 27 U/L RC / AST: 25 U/L / GGT: x           PT/INR - ( 05 Jul 2017 01:41 )   PT: 11.4 sec;   INR: 1.05 ratio         PTT - ( 05 Jul 2017 01:41 )  PTT:26.4 sec PATIENT:  ELANA MENDOZA  [id number]    CHIEF COMPLAINT:  Patient is a 31y old  Male who presents with a chief complaint of Chest pain (04 Jul 2017 19:28)      INTERVAL HISTORY: Patient seen and examined at bedside. No events overnight. Denies any recurrent chest pain. No complaints this morning.    REVIEW OF SYSTEMS: No chest pains or shortness of breath this AM.    CONSTITUTIONAL: No weakness, fevers or chills  EYES/ENT: No visual changes;  No vertigo or throat pain   NECK: No pain or stiffness  RESPIRATORY: No cough, wheezing, hemoptysis; No shortness of breath  CARDIOVASCULAR: No chest pain or palpitations  GASTROINTESTINAL: No abdominal or epigastric pain. No nausea, vomiting, or hematemesis; No diarrhea or constipation. No melena or hematochezia.  GENITOURINARY: No dysuria, frequency or hematuria  NEUROLOGICAL: No numbness or weakness  SKIN: No itching, burning, rashes, or lesions   All other review of systems is negative unless indicated above.    MEDICATIONS  (STANDING):  metoprolol 12.5 milliGRAM(s) Oral two times a day  heparin  Injectable 5000 Unit(s) SubCutaneous every 12 hours  lisinopril 2.5 milliGRAM(s) Oral daily    MEDICATIONS  (PRN):  acetaminophen   Tablet. 650 milliGRAM(s) Oral every 6 hours PRN mild and moderate pain      OBJECTIVE:  ICU Vital Signs Last 24 Hrs  T(C): 36.6 (06 Jul 2017 04:00), Max: 37.2 (05 Jul 2017 19:00)  T(F): 97.8 (06 Jul 2017 04:00), Max: 98.9 (05 Jul 2017 19:00)  HR: 62 (06 Jul 2017 06:00) (50 - 76)  BP: 114/62 (06 Jul 2017 06:00) (101/66 - 148/79)  BP(mean): 80 (06 Jul 2017 06:00) (70 - 125)  ABP: --  ABP(mean): --  RR: 13 (06 Jul 2017 06:00) (10 - 32)  SpO2: 98% (05 Jul 2017 23:00) (98% - 98%)      I&O's Summary    05 Jul 2017 07:01  -  06 Jul 2017 07:00  --------------------------------------------------------  IN: 1200 mL / OUT: 0 mL / NET: 1200 mL      General: WN/WD NAD  HEENT: PERRLA, EOMI, moist mucous membranes  Neurology: A&Ox3, nonfocal, MERRITT x 4  Respiratory: CTA B/L, normal respiratory effort, no wheezes, crackles, rales  CV: RRR, S1S2, no murmurs, rubs or gallops  Abdominal: Soft, NT, ND +BS, Last BM  Extremities: No edema, + peripheral pulses  Incisions:   Tubes:    TELEMETRY:     EKG:     IMAGING:    LABS:                          13.8   9.8   )-----------( 216      ( 06 Jul 2017 06:19 )             42.5     07-06    140  |  105  |  9   ----------------------------<  114<H>  3.9   |  24  |  0.80    Ca    8.9      06 Jul 2017 06:19  Phos  3.1     07-06  Mg     2.0     07-06    TPro  6.5  /  Alb  3.5  /  TBili  0.2  /  DBili  x   /  AST  25  /  ALT  27  /  AlkPhos  66  07-06    LIVER FUNCTIONS - ( 06 Jul 2017 06:19 )  Alb: 3.5 g/dL / Pro: 6.5 g/dL / ALK PHOS: 66 U/L / ALT: 27 U/L RC / AST: 25 U/L / GGT: x           PT/INR - ( 05 Jul 2017 01:41 )   PT: 11.4 sec;   INR: 1.05 ratio         PTT - ( 05 Jul 2017 01:41 )  PTT:26.4 sec

## 2017-07-06 NOTE — DISCHARGE NOTE ADULT - CARE PROVIDERS DIRECT ADDRESSES
[email or web address]

## 2017-07-18 ENCOUNTER — NON-APPOINTMENT (OUTPATIENT)
Age: 32
End: 2017-07-18

## 2017-07-18 ENCOUNTER — APPOINTMENT (OUTPATIENT)
Dept: CARDIOLOGY | Facility: CLINIC | Age: 32
End: 2017-07-18

## 2017-07-18 VITALS
WEIGHT: 236 LBS | BODY MASS INDEX: 33.79 KG/M2 | HEART RATE: 62 BPM | OXYGEN SATURATION: 96 % | HEIGHT: 70 IN | DIASTOLIC BLOOD PRESSURE: 85 MMHG | SYSTOLIC BLOOD PRESSURE: 130 MMHG

## 2017-07-18 DIAGNOSIS — Z82.69 FAMILY HISTORY OF OTHER DISEASES OF THE MUSCULOSKELETAL SYSTEM AND CONNECTIVE TISSUE: ICD-10-CM

## 2017-07-18 DIAGNOSIS — Z82.49 FAMILY HISTORY OF ISCHEMIC HEART DISEASE AND OTHER DISEASES OF THE CIRCULATORY SYSTEM: ICD-10-CM

## 2017-07-18 DIAGNOSIS — Z78.9 OTHER SPECIFIED HEALTH STATUS: ICD-10-CM

## 2017-07-18 DIAGNOSIS — Z87.891 PERSONAL HISTORY OF NICOTINE DEPENDENCE: ICD-10-CM

## 2017-08-03 ENCOUNTER — RX RENEWAL (OUTPATIENT)
Age: 32
End: 2017-08-03

## 2017-08-03 RX ORDER — LISINOPRIL 5 MG/1
5 TABLET ORAL DAILY
Qty: 30 | Refills: 3 | Status: DISCONTINUED | COMMUNITY
Start: 2017-07-18 | End: 2017-08-03

## 2017-08-10 ENCOUNTER — APPOINTMENT (OUTPATIENT)
Dept: CARDIOLOGY | Facility: CLINIC | Age: 32
End: 2017-08-10
Payer: COMMERCIAL

## 2017-08-10 VITALS
BODY MASS INDEX: 34.07 KG/M2 | SYSTOLIC BLOOD PRESSURE: 137 MMHG | DIASTOLIC BLOOD PRESSURE: 67 MMHG | OXYGEN SATURATION: 97 % | TEMPERATURE: 99.2 F | WEIGHT: 238 LBS | HEIGHT: 70 IN | HEART RATE: 79 BPM

## 2017-08-10 DIAGNOSIS — I42.8 OTHER CARDIOMYOPATHIES: ICD-10-CM

## 2017-08-10 DIAGNOSIS — E66.9 OBESITY, UNSPECIFIED: ICD-10-CM

## 2017-08-10 DIAGNOSIS — Z00.00 ENCOUNTER FOR GENERAL ADULT MEDICAL EXAMINATION W/OUT ABNORMAL FINDINGS: ICD-10-CM

## 2017-08-10 PROCEDURE — 99214 OFFICE O/P EST MOD 30 MIN: CPT

## 2017-08-10 RX ORDER — METOPROLOL SUCCINATE 25 MG/1
25 TABLET, EXTENDED RELEASE ORAL DAILY
Qty: 90 | Refills: 3 | Status: ACTIVE | COMMUNITY
Start: 2017-07-18 | End: 1900-01-01

## 2017-08-10 RX ORDER — LISINOPRIL 20 MG/1
20 TABLET ORAL DAILY
Qty: 90 | Refills: 2 | Status: ACTIVE | COMMUNITY
Start: 2017-08-03 | End: 1900-01-01

## 2020-07-11 NOTE — H&P ADULT - HISTORY OF PRESENT ILLNESS
Hospitalist Progress Note  7/11/2020 10:55 AM  Subjective:   Admit Date: 7/6/2020  PCP: Mercedez Hernandez MD  Interval History: pt is alert and awake  In bed  Feels slightly better  meds are helping otherwise  Denies any other complaints    Chart reviewed. Diet: Diet NPO Effective Now  Medications:   Scheduled Meds:   piperacillin-tazobactam  2.25 g Intravenous Q8H    doxycycline (VIBRAMYCIN) IV  100 mg Intravenous Q12H    fluconazole  100 mg Intravenous Q24H    cloNIDine  1 patch Transdermal Weekly    pantoprazole  40 mg Intravenous Daily    And    sodium chloride (PF)  10 mL Intravenous Daily    sodium chloride flush  10 mL Intravenous 2 times per day    [Held by provider] heparin (porcine)  5,000 Units Subcutaneous BID    insulin lispro  0-6 Units Subcutaneous TID WC    insulin lispro  0-3 Units Subcutaneous Nightly     Continuous Infusions:   dextrose 150 mL/hr at 07/11/20 0526    dextrose       CBC:   Recent Labs     07/09/20  0545 07/10/20  0535 07/10/20  1600 07/11/20  0550   WBC 36.5* 36.7*  --  35.2*   HGB 7.5* 6.5* 7.9* 7.5*   * 377  --  358     BMP:    Recent Labs     07/09/20  0545 07/10/20  0535 07/11/20  0550    140 131*   K 4.6 4.7 4.3   CL 95* 100 92*   CO2 17* 19* 18*   * 124* 117*   CREATININE 4.5* 4.7* 4.6*   GLUCOSE 104* 84 132*     Hepatic:   Recent Labs     07/09/20  0545 07/10/20  0535 07/11/20  0550   * 69* 49*   ALT 48* 42* 37   BILITOT 4.1* 3.0* 2.0*   ALKPHOS 101 113 110     Troponin: No results for input(s): TROPONINI in the last 72 hours. BNP: No results for input(s): BNP in the last 72 hours. Lipids: No results for input(s): CHOL, HDL in the last 72 hours.     Invalid input(s): LDLCALCU  INR:   Recent Labs     07/09/20  0545 07/10/20  0535 07/11/20  0550   INR 1.29* 1.31* 1.28*       Objective:   Vitals: /82   Pulse 78   Temp 97.8 °F (36.6 °C) (Oral)   Resp 16   Ht 4' 11\" (1.499 m)   Wt 262 lb 5.6 oz (119 kg)   SpO2 95%   BMI 52.99 kg/m²   General appearance: alert and awake  Has NGT  HEENT: Head: Normal, normocephalic, atraumatic, anicteric, pupils are reactive to light. Dry mucous membrane. Neck: no adenopathy, no carotid bruit, supple, symmetrical, trachea midline and thyroid not enlarged, symmetric, no tenderness/mass/nodules  Lungs: clear to auscultation bilaterally  Heart: regular rate and rhythm, S1, S2 normal, no murmur, click, rub or gallop  Abdomen: soft, -tender; bowel sounds normal; no masses,  no organomegaly, has drain  Dressing intact  Extremities: extremities mild edemaNeurologic: Mental status: alert and awake    Assessment and Plan:   1. KIKI= as per dr Leighann Marvin  2. HTN  3. Sepsis-on iv abx  4. Dm- monitor sugars  5. Anemia= monitor h/h  Peritonitis- on iv abx  Patient Active Problem List:     EMERY (obstructive sleep apnea)     ARF (acute renal failure) (HCC)     SBO (small bowel obstruction) (HCC)     Leukocytosis     Septicemia (HCC)     Acidosis     KIKI (acute kidney injury) ([facility] [address]     Gallbladder disease     Hypertension     Type 2 diabetes mellitus with hyperglycemia ([facility] [address]    Pt is stable. Discussed with staff  about the plan. See the orders for further plan. Will proceed further acc to pt's progress.   Time spent with management of the pt is-30 minutes      Electronically signed by: Blaise Smith MD, on 7/11/2020, at 10:55 AM 31 M with no PMH who presented to [facility] with substernal chest pain with radiation down his left arm. Pain started this morning and was pressure-like. At [place], his cardiac enzymes were positive and he had dynamic EKG changes with MATTHEW in I and AVL as well as R waves in V1/V2. Patient was transferred to [facility] for urgent cardiac catherization which revealed no significant coronary artery disease. Patient currently chest pain free. He endorses fever over the past few days with associated nausea, vomiting, and runny nose. Denies cough or SOB. Reports relief with motrin at home. 31 M with no PMH who presented to [facility] with substernal chest pain with radiation down his left arm. Pain started this morning and was pressure-like. At [place], his cardiac enzymes were positive and he had dynamic EKG changes with MATTHEW in I and AVL as well as R waves in V1/V2. Patient was transferred to [facility] for urgent cardiac catherization which revealed no significant coronary artery disease. Patient currently chest pain free. He endorses fever over the past few days with associated nausea, vomiting, and runny nose. Denies cough or SOB. Reports relief with motrin at home.     Vitals stable on arrival to CCU: 99.5, 129/77, 68bpm, 10RR @97% O2.

## 2023-10-12 NOTE — DISCHARGE NOTE ADULT - PATIENT PORTAL LINK FT
“You can access the [employer] Patient Portal, offered by [facility], by registering with the following website: [email or web address]” no

## 2025-04-30 NOTE — PATIENT PROFILE ADULT. - FUNCTIONAL SCREEN CURRENT LEVEL: TRANSFERRING, MLM
Last office visit: 4-     Next office visit: 6 week FU      Surgery: L4-L5 LAMINECTOMY WITH FULL FACETECTOMY, SPINE, LUMBAR, L4-L5 POSTERIOR INSTRUMENTATION AND FUSION DOS 4-     Medication: oxycodone, Date last filled: 4-     Is the patient due for refill of this medication(s): Yes, new frequency      PDMP review: Criteria met. Prescription printed for Physician/MANUEL signature.     Plan: Next script will remain at 1-2 tablets every 4 hours prn for pain.            (0) independent